# Patient Record
Sex: FEMALE
[De-identification: names, ages, dates, MRNs, and addresses within clinical notes are randomized per-mention and may not be internally consistent; named-entity substitution may affect disease eponyms.]

---

## 2017-06-28 ENCOUNTER — HOSPITAL ENCOUNTER (EMERGENCY)
Dept: HOSPITAL 14 - H.ER | Age: 22
Discharge: HOME | End: 2017-06-28
Payer: COMMERCIAL

## 2017-06-28 VITALS
OXYGEN SATURATION: 100 % | TEMPERATURE: 98.3 F | RESPIRATION RATE: 16 BRPM | DIASTOLIC BLOOD PRESSURE: 71 MMHG | SYSTOLIC BLOOD PRESSURE: 107 MMHG

## 2017-06-28 VITALS — HEART RATE: 67 BPM

## 2017-06-28 VITALS — BODY MASS INDEX: 22.6 KG/M2

## 2017-06-28 DIAGNOSIS — R07.89: Primary | ICD-10-CM

## 2017-06-28 DIAGNOSIS — J45.909: ICD-10-CM

## 2017-06-28 DIAGNOSIS — R51: ICD-10-CM

## 2017-06-28 LAB
ALBUMIN SERPL-MCNC: 4.7 G/DL (ref 3.5–5)
ALBUMIN/GLOB SERPL: 1.5 {RATIO} (ref 1–2.1)
ALT SERPL-CCNC: 42 U/L (ref 9–52)
AST SERPL-CCNC: 27 U/L (ref 14–36)
BASOPHILS # BLD AUTO: 0.1 K/UL (ref 0–0.2)
BASOPHILS NFR BLD: 0.8 % (ref 0–2)
BUN SERPL-MCNC: 14 MG/DL (ref 7–17)
CALCIUM SERPL-MCNC: 9.3 MG/DL (ref 8.4–10.2)
EOSINOPHIL # BLD AUTO: 0.2 K/UL (ref 0–0.7)
EOSINOPHIL NFR BLD: 2.5 % (ref 0–4)
ERYTHROCYTE [DISTWIDTH] IN BLOOD BY AUTOMATED COUNT: 12.9 % (ref 11.5–14.5)
GFR NON-AFRICAN AMERICAN: > 60
HGB BLD-MCNC: 14.3 G/DL (ref 12–16)
LYMPHOCYTES # BLD AUTO: 2.6 K/UL (ref 1–4.3)
LYMPHOCYTES NFR BLD AUTO: 42.2 % (ref 20–40)
MCH RBC QN AUTO: 31.8 PG (ref 27–31)
MCHC RBC AUTO-ENTMCNC: 33.7 G/DL (ref 33–37)
MCV RBC AUTO: 94.2 FL (ref 81–99)
MONOCYTES # BLD: 0.6 K/UL (ref 0–0.8)
MONOCYTES NFR BLD: 9.3 % (ref 0–10)
NEUTROPHILS # BLD: 2.7 K/UL (ref 1.8–7)
NEUTROPHILS NFR BLD AUTO: 45.2 % (ref 50–75)
NRBC BLD AUTO-RTO: 0.1 % (ref 0–0)
PLATELET # BLD: 306 K/UL (ref 130–400)
PMV BLD AUTO: 7.6 FL (ref 7.2–11.7)
RBC # BLD AUTO: 4.5 MIL/UL (ref 3.8–5.2)
WBC # BLD AUTO: 6.1 K/UL (ref 4.8–10.8)

## 2017-06-28 NOTE — CT
CT head without IV contrast 



History: Headache 



Technique: 



Axial computed tomography images were obtained through the head/brain 

without intravenous contrast.  



This CT exam was performed using 1 or more of the falling dose 

reduction techniques: Automated exposure control, adjustment of the 

MAA and/or kV according to patient size, and/or use of iterative 

reconstruction technique. 



Radiation dose:



Total exam DLP = 811.25 mGy-cm.



Comparison: Noncontrast head CT performed 7/1/15 



Findings: 



The ventricles, sulci, and cisterns appear within normal limits.  No 

intracranial masses or hemorrhages are identified. The gray-white 

matter differentiation appears intact.  Please note that MRI with 

diffusion imaging is more sensitive in the detection of acute 

ischemic event.



Visualized paranasal sinuses, mastoid air cells, and both orbits 

appear unremarkable. 



Impression: 



No acute intracranial pathology identified. 



Preliminary impression was provided by virtual radiologic.

## 2017-06-28 NOTE — ED PDOC
HPI: Chest Pain


Time Seen by Provider: 17 00:50


Chief Complaint (Nursing): Chest Pain


Chief Complaint (Provider): chest pain


History Per: Patient


History/Exam Limitations: no limitations


Onset/Duration Of Symptoms: Hrs


Current Symptoms Are (Timing): Still Present


Pain Scale Rating Of: 6


Additional Complaint(s): 


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 

started tonight, 6/10 in severity. Patient states she has also had a headache 

for 1 month. Does not see a cardiologist and has had similar chest pain before. 

Denies fever, cough, v/d, SOB, abdominal pain. 








Past Medical History


Reviewed: Historical Data, Nursing Documentation, Vital Signs


Vital Signs: 


 Last Vital Signs











Temp  98.3 F   17 01:05


 


Pulse  67   17 01:51


 


Resp  16   17 01:05


 


BP  107/71   17 01:05


 


Pulse Ox  100   17 03:41














- Medical History


PMH: Asthma


   Denies: Alzheimer's Disease, Chronic Kidney Disease


Other PMH: WPW 





- Surgical History


Surgical History: No Surg Hx





- Family History


Family History: States: No Known Family Hx





- Social History


Current smoker - smoking cessation education provided: No


Alcohol: None


Drugs: Denies





- Home Medications


Home Medications: 


 Ambulatory Orders











 Medication  Instructions  Recorded


 


Prenatal Vit No.126/Iron/Folic 1 tab PO DAILY 16





[Prenavite]  


 


Ondansetron [Zofran Odt] 4 mg PO ASDIR PRN #10 odt 16


 


Albuterol HFA [Ventolin HFA 90 1 puff IH Q6 PRN #1 inhaler 17





mcg/actuation (8 g)]  














- Allergies


Allergies/Adverse Reactions: 


 Allergies











Allergy/AdvReac Type Severity Reaction Status Date / Time


 


influenza virus vaccine, Allergy  ANAPHYLAXIS Verified 16 13:19





specific     





[influenza virus     





vacc,specific]     


 


latex Allergy  RASH Verified 16 13:19


 


oseltamivir phosphate Allergy  ANAPHYLAXIS Verified 16 13:19





[From Tamiflu]     


 


pomegranate Allergy  RASH Verified 16 13:19














Review of Systems


ROS Statement: Except As Marked, All Systems Reviewed And Found Negative


Constitutional: Negative for: Fever


Cardiovascular: Positive for: Chest Pain


Respiratory: Negative for: Cough, Shortness of Breath


Gastrointestinal: Negative for: Vomiting, Abdominal Pain, Diarrhea


Neurological: Positive for: Headache





Physical Exam





- Reviewed


Nursing Documentation Reviewed: Yes


Vital Signs Reviewed: Yes





- Physical Exam


Appears: Positive for: Well, No Acute Distress


Head Exam: Positive for: ATRAUMATIC, NORMAL INSPECTION, NORMOCEPHALIC


Skin: Positive for: Normal Color


Neck: Positive for: Normal


Cardiovascular/Chest: Positive for: Regular Rate, Rhythm.  Negative for: Murmur

, Tachycardia


Respiratory: Positive for: Normal Breath Sounds.  Negative for: Wheezing, 

Respiratory Distress


Gastrointestinal/Abdominal: Positive for: Normal Exam, Soft.  Negative for: 

Tenderness


Back: Positive for: Normal Inspection


Extremity: Positive for: Normal ROM.  Negative for: Deformity, Swelling


Neurologic/Psych: Positive for: Alert, Oriented





- Laboratory Results


Result Diagrams: 


 17 02:55





 17 02:55





- ECG


ECG: Positive for: Interpreted By Me, Viewed By Me


ECG Rhythm: Positive for: Sinus Rhythm (WPW, NSR at 73 bpm )


O2 Sat by Pulse Oximetry: 100


Pulse Ox Interpretation: Normal (RA)





Medical Decision Making


Medical Decision Makin:


Impression: chest pain, headache





Plan:


CT head


Labs


Toradol 30mg IV


reassess





0208:


CT head impression:


1. No acute intracranial abnormality.





0332: Labs reviewed, WNL. CT negative. Patient has been sleeping comfortably in 

ED and is stable for d/c. Advised patient to f/u outpatient with cardiologist 

and neurologist. Return to the ED with any worsening or concerning symptoms. 








--------------------------


Scribe Attestation:


Documented by BIANCA Ingram acting as a scribe for Gloria Chahal MD.    


Provider Scribe Attestation:


All medical record entries made by the Scribe were at my direction and 

personally dictated by me. I


have reviewed the chart and agree that the record accurately reflects my 

personal performance of the


history, physical exam, medical decision making, and the department course for 

this patient. I have


also personally directed, reviewed, and agree with the discharge instructions 

and disposition.    








Disposition





- Clinical Impression


Clinical Impression: 


 Chest wall pain, Headache








- Patient ED Disposition


Is Patient to be Admitted: No


Counseled Patient/Family Regarding: Studies Performed, Diagnosis, Need For 

Followup





- Disposition


Referrals: 


 Service [Outside]


Pritesh Lang MD [Staff Provider] - 


Claude Pierce MD [Staff Provider] - 


Disposition: Routine/Home


Disposition Time: 03:00


Condition: IMPROVED


Additional Instructions: 


follow up with your doctor within 1-2 days


return to the ED with any worsening or concerning symptoms. 


Instructions:  Chest Wall Pain (ED), General Headache (ED)

## 2017-07-06 NOTE — CARD
--------------- APPROVED REPORT --------------





EKG Measurement

Heart [id number]

VT [id number]

[id number]

[id number]

[id number]



<Conclusion>

Normal sinus rhythm

Ventricular pre-excitation, WPW pattern type B

Abnormal ECG

## 2017-08-25 ENCOUNTER — HOSPITAL ENCOUNTER (EMERGENCY)
Dept: HOSPITAL 14 - H.ER | Age: 22
Discharge: HOME | End: 2017-08-25
Payer: COMMERCIAL

## 2017-08-25 VITALS
OXYGEN SATURATION: 100 % | DIASTOLIC BLOOD PRESSURE: 65 MMHG | RESPIRATION RATE: 16 BRPM | HEART RATE: 80 BPM | TEMPERATURE: 99.1 F | SYSTOLIC BLOOD PRESSURE: 105 MMHG

## 2017-08-25 VITALS — BODY MASS INDEX: 19.9 KG/M2

## 2017-08-25 DIAGNOSIS — T14.8: ICD-10-CM

## 2017-08-25 DIAGNOSIS — M79.604: Primary | ICD-10-CM
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
## 2017-08-25 NOTE — US
EXAM:

  US Duplex Right Lower Extremity Veins



EXAM DATE/TIME:

  8/25/2017 6:38 PM



CLINICAL HISTORY:

  21 years old, female; Pain; Leg, lower; Right; Additional info: Right leg pain



TECHNIQUE:

  Real-time ultrasound scan of the veins of the right lower extremity with 

color Doppler flow, spectral waveform analysis and compression.



COMPARISON:

  There are no prior studies for comparison.



FINDINGS:

  Deep veins: Common femoral, superficial femoral, popliteal and posterior 

tibial veins were evaluated.



All veins examined are compressible. There are no intraluminal filling defects. 

There is expected blood flow on Doppler imaging. There is change in waveform 

with augmentation.



  Soft tissues: No focal abnormality is seen in the area cutaneous erythema



Impression:  No deep venous thrombosis in the visualized vascular segments of 

the right lower extremity

## 2017-08-25 NOTE — ED PDOC
HPI: General Adult


Time Seen by Provider: 08/25/17 18:38


Chief Complaint (Nursing): Lower Extremity Problem/Injury


Chief Complaint (Provider): right leg pain


History Per: Patient (20 y/o female sent by a clinic for evaluation of right 

calf pain.  Patient states she has noted small bump that she assumed was insect 

bite.  She has scratched this location intermittently but notes increasing pain 

since.  )





Past Medical History


Reviewed: Historical Data, Nursing Documentation, Vital Signs


Vital Signs: 


 Last Vital Signs











Temp  99.1 F   08/25/17 18:24


 


Pulse  80   08/25/17 18:24


 


Resp  16   08/25/17 18:24


 


BP  105/65   08/25/17 18:24


 


Pulse Ox  100   08/25/17 18:42














- Medical History


PMH: Asthma, CAD (WPW)


   Denies: Alzheimer's Disease, Chronic Kidney Disease





- Family History


Family History: States: Unknown Family Hx





- Home Medications


Home Medications: 


 Ambulatory Orders











 Medication  Instructions  Recorded


 


Prenatal Vit No.126/Iron/Folic 1 tab PO DAILY 09/19/16





[Prenavite]  


 


Ondansetron [Zofran Odt] 4 mg PO ASDIR PRN #10 odt 12/05/16


 


Albuterol HFA [Ventolin HFA 90 1 puff IH Q6 PRN #1 inhaler 03/04/17





mcg/actuation (8 g)]  


 


Cephalexin [Keflex] 1 tab PO QID #28 capsule 08/25/17


 


Hydrocortisone 1% Cream [Cortizone 0.5 gm TP BID #1 tube 08/25/17





1% Cream]  














- Allergies


Allergies/Adverse Reactions: 


 Allergies











Allergy/AdvReac Type Severity Reaction Status Date / Time


 


influenza virus vaccine, Allergy  ANAPHYLAXIS Verified 12/05/16 13:19





specific     





[influenza virus     





vacc,specific]     


 


latex Allergy  RASH Verified 12/05/16 13:19


 


oseltamivir phosphate Allergy  ANAPHYLAXIS Verified 12/05/16 13:19





[From Tamiflu]     


 


pomegranate Allergy  RASH Verified 12/05/16 13:19














Review of Systems


ROS Statement: Except As Marked, All Systems Reviewed And Found Negative





Physical Exam





- Reviewed


Nursing Documentation Reviewed: Yes


Vital Signs Reviewed: Yes





- Physical Exam


Appears: Positive for: Well, Non-toxic, No Acute Distress


Head Exam: Positive for: ATRAUMATIC, NORMAL INSPECTION, NORMOCEPHALIC


Skin: Positive for: Normal Color, Warm, DRY


Eye Exam: Positive for: EOMI, Normal appearance, PERRL


ENT: Positive for: Normal ENT Inspection


Neck: Positive for: Normal, Painless ROM


Cardiovascular/Chest: Positive for: Regular Rate, Rhythm


Respiratory: Positive for: CNT, Normal Breath Sounds


Gastrointestinal/Abdominal: Positive for: Normal Exam, Bowel Sounds, Soft


Back: Positive for: Normal Inspection


Extremity: Positive for: Normal ROM, Tenderness (right calf tenderness.  (+) 

hyperpigmentation noted right calf region.  small lesions noted hyperdense 

generalized on right calf.)


Neurologic/Psych: Positive for: Alert, Oriented





- ECG


O2 Sat by Pulse Oximetry: 100





- Progress


ED Course And Treament: 





DUPLEX RIGHT: NEG FOR DVT





Medical Decision Making


Medical Decision Making: 





Rash does not appear like shingles.  Moderate tenderness noted.  Possible 

infected insect bite. WIll r/o DVT





Disposition





- Clinical Impression


Clinical Impression: 


 Insect bite








- Patient ED Disposition


Is Patient to be Admitted: No





- Disposition


Referrals: 


[facility] [Outside]


Disposition: Routine/Home


Disposition Time: 19:15


Condition: STABLE


Prescriptions: 


Cephalexin [Keflex] 1 tab PO QID #28 capsule


Hydrocortisone 1% Cream [Cortizone 1% Cream] 0.5 gm TP BID #1 tube


Instructions:  Insect Bite or Sting (ED)

## 2017-09-08 ENCOUNTER — HOSPITAL ENCOUNTER (EMERGENCY)
Dept: HOSPITAL 14 - H.ER | Age: 22
Discharge: HOME | End: 2017-09-08
Payer: COMMERCIAL

## 2017-09-08 VITALS
OXYGEN SATURATION: 99 % | SYSTOLIC BLOOD PRESSURE: 110 MMHG | HEART RATE: 90 BPM | RESPIRATION RATE: 18 BRPM | DIASTOLIC BLOOD PRESSURE: 70 MMHG

## 2017-09-08 VITALS — TEMPERATURE: 98.8 F

## 2017-09-08 VITALS — BODY MASS INDEX: 19.9 KG/M2

## 2017-09-08 DIAGNOSIS — N93.9: ICD-10-CM

## 2017-09-08 DIAGNOSIS — J32.9: Primary | ICD-10-CM

## 2017-09-08 LAB
ALBUMIN/GLOB SERPL: 1.3 {RATIO} (ref 1–2.1)
ALP SERPL-CCNC: 117 U/L (ref 38–126)
ALT SERPL-CCNC: 140 U/L (ref 9–52)
AST SERPL-CCNC: 99 U/L (ref 14–36)
BASOPHILS # BLD AUTO: 0 K/UL (ref 0–0.2)
BASOPHILS NFR BLD: 0.3 % (ref 0–2)
BILIRUB SERPL-MCNC: 1.1 MG/DL (ref 0.2–1.3)
BUN SERPL-MCNC: 9 MG/DL (ref 7–17)
CALCIUM SERPL-MCNC: 9.5 MG/DL (ref 8.4–10.2)
CHLORIDE SERPL-SCNC: 102 MMOL/L (ref 98–107)
CO2 SERPL-SCNC: 26 MMOL/L (ref 22–30)
EOSINOPHIL # BLD AUTO: 0 K/UL (ref 0–0.7)
EOSINOPHIL NFR BLD: 0.4 % (ref 0–4)
ERYTHROCYTE [DISTWIDTH] IN BLOOD BY AUTOMATED COUNT: 12.4 % (ref 11.5–14.5)
ERYTHROCYTE [SEDIMENTATION RATE] IN BLOOD: 62 MM/HR (ref 0–20)
GLOBULIN SER-MCNC: 3.4 GM/DL (ref 2.2–3.9)
GLUCOSE SERPL-MCNC: 98 MG/DL (ref 65–105)
HCT VFR BLD CALC: 40.5 % (ref 34–47)
LYMPHOCYTES # BLD AUTO: 1.4 K/UL (ref 1–4.3)
LYMPHOCYTES NFR BLD AUTO: 12.7 % (ref 20–40)
MCH RBC QN AUTO: 30.7 PG (ref 27–31)
MCHC RBC AUTO-ENTMCNC: 32.8 G/DL (ref 33–37)
MCV RBC AUTO: 93.5 FL (ref 81–99)
MONOCYTES # BLD: 1.1 K/UL (ref 0–0.8)
MONOCYTES NFR BLD: 10.3 % (ref 0–10)
NEUTROPHILS # BLD: 8.4 K/UL (ref 1.8–7)
NEUTROPHILS NFR BLD AUTO: 76.3 % (ref 50–75)
NRBC BLD AUTO-RTO: 0.1 % (ref 0–0)
PLATELET # BLD: 289 K/UL (ref 130–400)
PMV BLD AUTO: 7.5 FL (ref 7.2–11.7)
POTASSIUM SERPL-SCNC: 4.1 MMOL/L (ref 3.6–5)
PROT SERPL-MCNC: 7.6 G/DL (ref 6.3–8.2)
SODIUM SERPL-SCNC: 139 MMOL/L (ref 132–148)
WBC # BLD AUTO: 11 K/UL (ref 4.8–10.8)

## 2017-09-08 PROCEDURE — 99283 EMERGENCY DEPT VISIT LOW MDM: CPT

## 2017-09-08 PROCEDURE — 85025 COMPLETE CBC W/AUTO DIFF WBC: CPT

## 2017-09-08 PROCEDURE — 70470 CT HEAD/BRAIN W/O & W/DYE: CPT

## 2017-09-08 PROCEDURE — 85651 RBC SED RATE NONAUTOMATED: CPT

## 2017-09-08 PROCEDURE — 81025 URINE PREGNANCY TEST: CPT

## 2017-09-08 PROCEDURE — 80053 COMPREHEN METABOLIC PANEL: CPT

## 2017-09-08 PROCEDURE — 96374 THER/PROPH/DIAG INJ IV PUSH: CPT

## 2017-09-08 NOTE — ED PDOC
HPI: Eye Injury/Pain


Time Seen by Provider: 09/08/17 11:49


Chief Complaint (Nursing): Eye Problem


Chief Complaint (Provider): Neurological Problem


History Per: Patient


History/Exam Limitations: no limitations


Onset/Duration Of Symptoms: Days (x2)


Current Symptoms Are (Timing): Still Present


Additional Complaint(s): 





Lor Ch is a 22 year old female presenting to the ED for an evaluation 

of right sided facial pain associated with blurry vision in her right eye and 

facial swelling occurring for 2 days prior to arrival. The patient reports 

being seen by a dentist where she had X-rays of her teeth completed and was 

told the problem is not her tooth. She states have subjective fevers on and off 

and heavy vaginal bleeding today. The patient reports taking Augmentin and 

Motrin for the pain, without relief. She denies nasal discharge, weakness in 

her extremities, difficulty in gait, coughing, or vomiting. 





PMD: None Provided 








Past Medical History


Reviewed: Historical Data, Nursing Documentation, Vital Signs


Vital Signs: 


 Last Vital Signs











Temp  98.8 F   09/08/17 11:26


 


Pulse  112 H  09/08/17 11:26


 


Resp  16   09/08/17 11:26


 


BP  118/70   09/08/17 11:26


 


Pulse Ox  98   09/08/17 11:29














- Medical History


PMH: Asthma, CAD (WPW)


   Denies: Alzheimer's Disease, Chronic Kidney Disease





- Family History


Family History: States: Unknown Family Hx





- Social History


Current smoker - smoking cessation education provided: Yes


Ex-Smoker (has not smoked in the last 12 months): No


Alcohol: None


Drugs: Denies





- Home Medications


Home Medications: 


 Ambulatory Orders











 Medication  Instructions  Recorded


 


Prenatal Vit No.126/Iron/Folic 1 tab PO DAILY 09/19/16





[Prenavite]  


 


Ondansetron [Zofran Odt] 4 mg PO ASDIR PRN #10 odt 12/05/16


 


Albuterol HFA [Ventolin HFA 90 1 puff IH Q6 PRN #1 inhaler 03/04/17





mcg/actuation (8 g)]  


 


Cephalexin [Keflex] 1 tab PO QID #28 capsule 08/25/17


 


Hydrocortisone 1% Cream [Cortizone 0.5 gm TP BID #1 tube 08/25/17





1% Cream]  


 


DiphenhydrAMINE [Benadryl] 50 mg PO Q6 PRN #24 cap 09/08/17


 


Levofloxacin [Levaquin] 500 mg PO DAILY #10 tablet 09/08/17


 


Naproxen 1 tab PO BID PRN #14 tab 09/08/17


 


Pseudoephedrine [Sudafed Tab] 60 mg PO Q6 PRN #20 tab 09/08/17


 


oxyCODONE/Acetaminophen [Percocet 1 ea PO Q6 PRN #6 tab 09/08/17





5/325 mg Tab]  


 


predniSONE [Prednisone] 3 tab PO DAILY #12 tab 09/08/17














- Allergies


Allergies/Adverse Reactions: 


 Allergies











Allergy/AdvReac Type Severity Reaction Status Date / Time


 


influenza virus vaccine, Allergy  ANAPHYLAXIS Verified 09/08/17 11:27





specific     





[influenza virus     





vacc,specific]     


 


latex Allergy  RASH Verified 09/08/17 11:27


 


oseltamivir phosphate Allergy  ANAPHYLAXIS Verified 09/08/17 11:27





[From Tamiflu]     


 


pomegranate Allergy  RASH Verified 09/08/17 11:27














Review of Systems


ROS Statement: Except As Marked, All Systems Reviewed And Found Negative


Constitutional: Positive for: Fever (subjective )


Eyes: Positive for: Vision Change (blurry vision in right eye )


ENT: Negative for: Nose Discharge


Respiratory: Negative for: Cough


Gastrointestinal: Negative for: Vomiting


Genitourinary Female: Positive for: Vaginal Bleeding (heavy)


Musculoskeletal: Negative for: Arm Pain, Leg Pain


Neurological: Positive for: Other (right sided facial pain; facial swelling ).  

Negative for: Incoordination





Physical Exam





- Reviewed


Nursing Documentation Reviewed: Yes


Vital Signs Reviewed: Yes





- Physical Exam


Appears: Positive for: Non-toxic, No Acute Distress


Head Exam: Positive for: ATRAUMATIC, NORMOCEPHALIC (facial swelling noted; 

tenderness along right side of face; nontendor dentition; difficulty noted 

opening jaw)


Skin: Positive for: Normal Color, Warm, Dry


Eye Exam: Positive for: Normal appearance, EOMI, PERRL


ENT: Positive for: Normal ENT Inspection


Neck: Positive for: Normal, Painless ROM


Cardiovascular/Chest: Positive for: Regular Rate, Rhythm.  Negative for: Murmur


Respiratory: Positive for: Normal Breath Sounds.  Negative for: Respiratory 

Distress


Gastrointestinal/Abdominal: Positive for: Normal Exam, Soft.  Negative for: 

Tenderness


Back: Positive for: Normal Inspection


Extremity: Positive for: Normal ROM.  Negative for: Deformity


DTR - Bicep (R): 2+


DTR - Bicep (L): 2+


DTR - Tricep (R): 2+


DTR - Tricep (L): 2+


DTR - Knee (R): 2+


DTR - Knee (L): 2+


DTR - Ankle (R): 2+


DTR - Ankle (L): 2+


Neurologic/Psych: Positive for: Alert, Oriented (x3), Other (5/5  strength )

.  Negative for: Motor/Sensory Deficits





- Laboratory Results


Result Diagrams: 


 09/08/17 12:40





 09/08/17 12:40





- ECG


O2 Sat by Pulse Oximetry: 98 (RA)


Pulse Ox Interpretation: Normal





- Progress


ED Course And Treament: 





Called to bedside after IV pulled out. Patient noted to have generalized 

urticaria.  After discussion with patient, will given benadryl 50 mg x 1 dose/

pepcid 20 mg/ prednisone 60 mg x 1 dose











Medical Decision Making


Medical Decision Making: 





Time: 11:49


Impression: Neurological Problem


Plan: 


* CT Head W/O Contrast


* CMP 


* CBC (With differential)


* SED Rate 


* NS 1,000 ml  mls/hr


* Toradol 15 mg IVP


* Reevaluation





--------------------------------------------------------------------------------

----------------- 


Scribe Attestation:


Documented by Charmaine Guallpa, acting as a scribe for Quentin Perez PA-C.


 


Provider Scribe Attestation:


All medical record entries made by the Scribe were at my direction and 

personally dictated by me. I have reviewed the chart and agree that the record 

accurately reflects my personal performance of the history, physical exam, 

medical decision making, and the department course for this patient. I have 

also personally directed, reviewed, and agree with the discharge instructions 

and disposition.








Disposition





- Clinical Impression


Clinical Impression: 


 Sinusitis








- Patient ED Disposition


Is Patient to be Admitted: No





- Disposition


Referrals: 


Marcelo Mulligan MD [Staff Provider] - 


Disposition: Routine/Home


Disposition Time: 15:27


Condition: FAIR


Prescriptions: 


DiphenhydrAMINE [Benadryl] 50 mg PO Q6 PRN #24 cap


 PRN Reason: Itching / Pruritus


Levofloxacin [Levaquin] 500 mg PO DAILY #10 tablet


Naproxen 1 tab PO BID PRN #14 tab


 PRN Reason: Pain, Moderate (4-7)


oxyCODONE/Acetaminophen [Percocet 5/325 mg Tab] 1 ea PO Q6 PRN #6 tab


 PRN Reason: Pain, Severe (8-10)


predniSONE [Prednisone] 3 tab PO DAILY #12 tab


Pseudoephedrine [Sudafed Tab] 60 mg PO Q6 PRN #20 tab


 PRN Reason: Nasal Congestion


Instructions:  Sinusitis (ED)


Forms:  [facility] (English), [facility] ED School/Work Excuse

## 2018-02-13 ENCOUNTER — HOSPITAL ENCOUNTER (EMERGENCY)
Dept: HOSPITAL 14 - H.ER | Age: 23
Discharge: HOME | End: 2018-02-13
Payer: COMMERCIAL

## 2018-02-13 VITALS — SYSTOLIC BLOOD PRESSURE: 105 MMHG | HEART RATE: 75 BPM | DIASTOLIC BLOOD PRESSURE: 63 MMHG | RESPIRATION RATE: 17 BRPM

## 2018-02-13 VITALS — TEMPERATURE: 98.2 F

## 2018-02-13 VITALS — BODY MASS INDEX: 19.9 KG/M2

## 2018-02-13 DIAGNOSIS — R07.89: Primary | ICD-10-CM

## 2018-02-13 LAB
APTT BLD: 27.2 SECONDS (ref 25.6–37.1)
BASOPHILS # BLD AUTO: 0 K/UL (ref 0–0.2)
BASOPHILS NFR BLD: 0.4 % (ref 0–2)
BUN SERPL-MCNC: 7 MG/DL (ref 7–17)
CALCIUM SERPL-MCNC: 9 MG/DL (ref 8.4–10.2)
D DIMER: 211 NG/MLDDU (ref 0–230)
EOSINOPHIL # BLD AUTO: 0.1 K/UL (ref 0–0.7)
EOSINOPHIL NFR BLD: 0.6 % (ref 0–4)
ERYTHROCYTE [DISTWIDTH] IN BLOOD BY AUTOMATED COUNT: 14.6 % (ref 11.5–14.5)
GFR NON-AFRICAN AMERICAN: > 60
HGB BLD-MCNC: 13.1 G/DL (ref 12–16)
INR PPP: 1 (ref 0.9–1.2)
LYMPHOCYTES # BLD AUTO: 2.1 K/UL (ref 1–4.3)
LYMPHOCYTES NFR BLD AUTO: 22 % (ref 20–40)
MCH RBC QN AUTO: 31.6 PG (ref 27–31)
MCHC RBC AUTO-ENTMCNC: 33.9 G/DL (ref 33–37)
MCV RBC AUTO: 93.2 FL (ref 81–99)
MONOCYTES # BLD: 0.7 K/UL (ref 0–0.8)
MONOCYTES NFR BLD: 7.3 % (ref 0–10)
NEUTROPHILS # BLD: 6.6 K/UL (ref 1.8–7)
NEUTROPHILS NFR BLD AUTO: 69.7 % (ref 50–75)
NRBC BLD AUTO-RTO: 0.1 % (ref 0–0)
PLATELET # BLD: 276 K/UL (ref 130–400)
PMV BLD AUTO: 7.4 FL (ref 7.2–11.7)
PROTHROMBIN TIME: 11.1 SECONDS (ref 9.8–13.1)
RBC # BLD AUTO: 4.14 MIL/UL (ref 3.8–5.2)
WBC # BLD AUTO: 9.5 K/UL (ref 4.8–10.8)

## 2018-02-13 NOTE — ED PDOC
HPI: Chest Pain


Time Seen by Provider: 18 17:38


Chief Complaint (Nursing): Chest Pain


Chief Complaint (Provider): Chest pain


History Per: Patient


History/Exam Limitations: no limitations


Onset/Duration Of Symptoms: Days (1)


Current Symptoms Are (Timing): Still Present


Additional History Per: Patient


Additional Complaint(s): 


21yo female,  with EGA of 16 weeks per latest ultrasound, presents to ED 

with complaints of left sided chest pain radiating to her left axilla area 

since yesterday morning. Of note, patient states she has a history of WPW and 

has not had any complications due to it; she denies any history of cardiac 

procedures as well. Patient states her chest pain is worse with deep 

inspiration and states she feels dizziness, numbness in left arm and tingling 

in her left hand. She denies any cough, fevers, loss of consciousness, leg 

swelling. No other complaints.





Past Medical History


Reviewed: Historical Data, Nursing Documentation, Vital Signs


Vital Signs: 


 Last Vital Signs











Temp  98.2 F   18 17:24


 


Pulse  81   18 18:37


 


Resp  16   18 17:24


 


BP  110/63   18 17:24


 


Pulse Ox  99   18 18:37














- Medical History


PMH: Asthma, CAD (WPW)


   Denies: Alzheimer's Disease, Chronic Kidney Disease





- Surgical History


Surgical History: No Surg Hx





- Family History


Family History: States: Unknown Family Hx





- Home Medications


Home Medications: 


 Ambulatory Orders











 Medication  Instructions  Recorded


 


Prenatal Vit No.126/Iron/Folic 1 tab PO DAILY 16





[Prenavite]  


 


Ondansetron [Zofran Odt] 4 mg PO ASDIR PRN #10 odt 16


 


Albuterol HFA [Ventolin HFA 90 1 puff IH Q6 PRN #1 inhaler 17





mcg/actuation (8 g)]  


 


Cephalexin [Keflex] 1 tab PO QID #28 capsule 17


 


Hydrocortisone 1% Cream [Cortizone 0.5 gm TP BID #1 tube 17





1% Cream]  


 


DiphenhydrAMINE [Benadryl] 50 mg PO Q6 PRN #24 cap 17


 


Levofloxacin [Levaquin] 500 mg PO DAILY #10 tablet 17


 


Naproxen 1 tab PO BID PRN #14 tab 17


 


Pseudoephedrine [Sudafed Tab] 60 mg PO Q6 PRN #20 tab 17


 


oxyCODONE/Acetaminophen [Percocet 1 ea PO Q6 PRN #6 tab 17





5/325 mg Tab]  


 


predniSONE [Prednisone] 3 tab PO DAILY #12 tab 17














- Allergies


Allergies/Adverse Reactions: 


 Allergies











Allergy/AdvReac Type Severity Reaction Status Date / Time


 


influenza virus vaccine, Allergy  ANAPHYLAXIS Verified 17 11:27





specific     





[influenza virus     





vacc,specific]     


 


latex Allergy  RASH Verified 17 11:27


 


oseltamivir phosphate Allergy  ANAPHYLAXIS Verified 17 11:27





[From Tamiflu]     


 


pomegranate Allergy  RASH Verified 17 11:27














Review of Systems


ROS Statement: Except As Marked, All Systems Reviewed And Found Negative


Constitutional: Negative for: Fever, Chills


Cardiovascular: Positive for: Chest Pain


Respiratory: Negative for: Cough, Shortness of Breath


Neurological: Positive for: Numbness (left arm), Dizziness





Physical Exam





- Reviewed


Nursing Documentation Reviewed: Yes


Vital Signs Reviewed: Yes





- Physical Exam


Appears: Positive for: Non-toxic, No Acute Distress


Head Exam: Positive for: ATRAUMATIC, NORMAL INSPECTION, NORMOCEPHALIC


Skin: Positive for: Normal Color, Warm, Dry


Eye Exam: Positive for: Normal appearance, EOMI, PERRL


Neck: Positive for: Normal, Supple


Cardiovascular/Chest: Positive for: Regular Rate, Rhythm


Respiratory: Positive for: Normal Breath Sounds.  Negative for: Wheezing


Gastrointestinal/Abdominal: Positive for: Other (gravid abdomen)


Back: Positive for: Normal Inspection


Extremity: Positive for: Normal ROM.  Negative for: Pedal Edema


Neurologic/Psych: Positive for: Alert, Oriented.  Negative for: Motor/Sensory 

Deficits





- Laboratory Results


Result Diagrams: 


 18 18:40





 18 18:40





- ECG


ECG Rhythm: Positive for: Sinus Rhythm.  Negative for: ST/T Changes


Interpretation Of ECG: 





Short AR wave


Rate: 81


O2 Sat by Pulse Oximetry: 99 (RA)


Pulse Ox Interpretation: Normal





Medical Decision Making


Medical Decision Making: 


Impression: Chest pain


Differential: ACS, pulmonary embolism


Plan:


-- EKG


-- Labs





Time: 


Case signed out to Dr. Perez pending ER workup, re-evaluation and disposition.





Scribe Attestation:


Documented by Mary Ann Doty acting as a scribe for Carina Clemens MD. 





Provider Attestation:


All medical record entries made by the Scribe were at my direction and 

personally dictated by me. I have reviewed the chart and agree that the record 

accurately reflects my personal performance of the history, physical exam, 

medical decision making, and the department course for this patient. I have 

also personally directed, reviewed, and agree with the discharge instructions 

and disposition.





Disposition





- Patient ED Disposition


Is Patient to be Admitted: Transfer of Care





- Disposition


Disposition: Transfer of Care


Disposition Time: 19:00


Forms:  [employer] (English)


Patient Signed Over To: Juan Pablo Perez

## 2018-02-13 NOTE — ED PDOC
- Laboratory Results


Result Diagrams: 


 02/13/18 18:40





 02/13/18 18:40





- ECG


O2 Sat by Pulse Oximetry: 99 (RA)


Pulse Ox Interpretation: Normal





Medical Decision Making


Medical Decision Making: 


Time: 1900


Patient signed out to me by Dr. Clemens pending labs, re-evaluation.





Time: 2000


Labs reviewed, D-Dimer levels with normal limits. Patient reports improvement 

of pain and is stable for discharge home. Patient to follow up with PCP in 1-2 

days.





Scribe Attestation:


Documented by Mary Ann Doty acting as a scribe for Juan Pablo Perez MD. 





Provider Scribe Attestation:


All medical record entries made by the Scribe were at my direction and 

personally dictated by me. I have reviewed the chart and agree that the record 

accurately reflects my personal performance of the history, physical exam, 

medical decision making, and the department course for this patient. I have 

also personally directed, reviewed, and agree with the discharge instructions 

and disposition.





Disposition





- Clinical Impression


Clinical Impression: 


 Chest pain








- POA


Present On Arrival: None





- Disposition


Disposition: Routine/Home


Disposition Time: 20:01


Condition: STABLE


Instructions:  Noncardiac Chest Pain (ED)


Forms:  [facility] (English)

## 2018-02-14 VITALS — OXYGEN SATURATION: 99 %

## 2018-03-05 ENCOUNTER — HOSPITAL ENCOUNTER (EMERGENCY)
Dept: HOSPITAL 14 - H.EROB2 | Age: 23
Discharge: HOME | End: 2018-03-05
Payer: COMMERCIAL

## 2018-03-05 VITALS — HEART RATE: 89 BPM | DIASTOLIC BLOOD PRESSURE: 49 MMHG | SYSTOLIC BLOOD PRESSURE: 111 MMHG

## 2018-03-05 VITALS — BODY MASS INDEX: 21.7 KG/M2

## 2018-03-05 DIAGNOSIS — R10.2: ICD-10-CM

## 2018-03-05 DIAGNOSIS — Z3A.19: ICD-10-CM

## 2018-03-05 DIAGNOSIS — O26.92: Primary | ICD-10-CM

## 2018-03-05 LAB
BACTERIA #/AREA URNS HPF: (no result) /[HPF]
BILIRUB UR-MCNC: NEGATIVE MG/DL
COLOR UR: YELLOW
GLUCOSE UR STRIP-MCNC: (no result) MG/DL
LEUKOCYTE ESTERASE UR-ACNC: (no result) LEU/UL
PH UR STRIP: 6 [PH] (ref 5–8)
PROT UR STRIP-MCNC: NEGATIVE MG/DL
RBC # UR STRIP: NEGATIVE /UL
SP GR UR STRIP: 1.01 (ref 1–1.03)
SQUAMOUS EPITHIAL: 3 /HPF (ref 0–5)
URINE CLARITY: (no result)
URINE NITRATE: NEGATIVE
UROBILINOGEN UR-MCNC: (no result) MG/DL (ref 0.2–1)

## 2018-03-05 NOTE — US
PROCEDURE:  Transvaginal obstruct ultrasonography



HISTORY:

lower abdominal pain



COMPARISON:

None available



TECHNIQUE:

Limited transvaginal obstetric ultrasound was performed as per 

referring physician request 4 cervical length only. This on 

discussion of the case by the technologist with Dr. Downs.



FINDINGS:

A single viable intrauterine gestation is identified with cardiac 

activity recorded 152 beats per minute. A high a posterior fundal 

placenta appears to developing with cervical internal os closed and 

overall cervical length measuring 4.7 cm. No fetal biometry 

performed/submitted.



IMPRESSION:

A single viable intrauterine gestation is identified with maternal 

cervical length of 4.7 cm without placental abruption or previa 

grossly evident.  Internal cervical os is closed. Fetal biometry in 

more complete evaluation can be performed on outpatient basis or is 

otherwise required. Please see discussion above.

## 2018-03-05 NOTE — OBHP
===========================

Datetime: 2018 13:03

===========================

   

IP Adm Impression:  , intrauterine pregnancy

IP Admit Plan:  Observation/Evaluation; Discharge home

Admit Comment, IP Provider:  23 yo  at 19+6 wks c/o bad cramps for 2-3 days, reports that she 
vomited x 4x on the 1st day of the cramping.  Pt report that she drinks a lot of water.  Pt denies VB
, LOF, and reports unsure about ctxns.  Pt reports that she doesn't have cramps now but has pain in l
ower abdomen.  Pt denies dysuria and reports that she has an appetite.  Pt reports that the pain is w
orse w/ long standing or can wake her from sleep at night.   

   PMH: Healthy 

   PSH: None 

   Meds: PNVs

   All: Penicillin and pomegranite cause throat swelling and hives 

   Flu shot causes "skin bubbles on affected arm" 

   Gyn hx: 10 x regular, denies STDs, denies abn paps

   Obhx: 2013 , female, 7#6

             2015 , male, 7#7

             2016 , female, 6# 

   Soc hx: pt denies tobacco, alcohol, and illicit drug use 

   Fam hx: N/c

   PE: AFVSS 

   Gen'l: NAD 

   Heart: RRR

   Chest: Lungs CTA b/l

   Abd: soft, NT, gravid, no CVA tenderness, mild suprapubic tenderness 

   Ext: NT, no edema

   VE: closed/ long/ posterior 

   A/P: 23 yo  at 19+6 wks w/ cramps x 2-3 days

   UA: blood and nitrate negative, LE small, bacteria rare; urine cx pending 

   U/s: Cervical length  4.7 cm w/o placental abruption or pevia grossly evident.  Internal os is mookie
sed.     

   Pain may be musculoskeletal, could be round ligament pain.  Rec that she stay well-hydrated and ma
y try tylenol for pain relief.   

   Discharged home w/ PTL precautions 

   Pt has an appointment w/ Dr. William on 2018.     

Extremities - PN:  Normal

Abdomen - PN:  Normal

Back - PN:  Normal

Lungs - PN:  Normal

Heart - PN:  Normal

Neurologic - PN:  Normal

General - PN:  Normal

FHR - Baseline A Provider:  155

Membranes, Provider:  Intact

Contraction Comments Provider:  None 

EGA AdmitDate IP:  19.6

Vital Signs Provider:  Reviewed

IP Chief Complaint:  Maternal discomfort

NICHD Decel Fetus A IP Provider:  None

Dilatation, Provider:  0

Effacement, Provider:  0

Station, Provider:  -3

Genitourinary Exam:  Normal

## 2018-03-05 NOTE — OBDCSUM
===========================

Datetime: 03/05/2018 15:20

===========================

   

Discharged to, Provider:  Home

Follow up at, Provider:  Dr William

Disch Instr Activity:  Normal activity

Disch Instr Diet:  Regular

Discharge Instructions, Provider:  Routine instructions given

Discharge Time:  03/05/2018 15:20

Follow up in weeks, Provider:  as per schedule

Disch Activity Restrictions:  No exercising; Minimize walking; Minimize stair-climbing; No sexual act
ivity; Nothing in vagina - [place], tampons, douche

Discharge Diagnosis Prov Other:  Maternal discomfort at 19+ weeks

## 2018-04-15 ENCOUNTER — HOSPITAL ENCOUNTER (EMERGENCY)
Dept: HOSPITAL 14 - H.ER | Age: 23
Discharge: HOME | End: 2018-04-15
Payer: COMMERCIAL

## 2018-04-15 VITALS
SYSTOLIC BLOOD PRESSURE: 103 MMHG | DIASTOLIC BLOOD PRESSURE: 66 MMHG | TEMPERATURE: 97.9 F | HEART RATE: 86 BPM | OXYGEN SATURATION: 99 % | RESPIRATION RATE: 16 BRPM

## 2018-04-15 VITALS — BODY MASS INDEX: 21.7 KG/M2

## 2018-04-15 DIAGNOSIS — I25.10: ICD-10-CM

## 2018-04-15 DIAGNOSIS — J32.9: Primary | ICD-10-CM

## 2018-04-15 DIAGNOSIS — Z88.0: ICD-10-CM

## 2018-04-15 DIAGNOSIS — J45.909: ICD-10-CM

## 2018-04-15 DIAGNOSIS — I45.6: ICD-10-CM

## 2018-04-15 NOTE — ED PDOC
HPI: CCC, URI, Sore Throat


Chief Complaint (Provider): Sinus


History Per: Patient


History/Exam Limitations: no limitations


Have you had recent travel within the past 21 days to any of the following 

countries: [place], [place], [place] or [place]?: No


Onset/Duration Of Symptoms: Waxing/Waning


Current Symptoms Are (Timing): Still Present


Location Of Pain: Sinus/es


Sick Contacts (Context): None


Associated Symptoms: Fever, Sinus Drainage, Nasal Congestion


Pain Scale Rating Of: 5


Additional History Per: Patient





<Ryder Omer - Last Filed: 04/15/18 05:40>





<Juan Pablo Perez - Last Filed: 18 05:35>


Time Seen by Provider: 04/15/18 05:11


Chief Complaint (Nursing): ENT Problem


Additional Complaint(s): 





23 y/o F 22 weeks pregnant with Hx of recurrent sinusitis presents to ED c/o 

sinus pain and nasal congestion. As per patient she started having symptoms 2-3 

days ago, she had a fever at home Yesterday, has been using saline drop that 

produces greenish drainage from L/nostril. Patient hasnt seen ENT for her 

recurrent sinus infection and dosnt have a PMD. She took Tylenol 50 mg before 

coming to hosp. As per patient she tried Amoxicillin in the past but developed 

hives. Old records show previous visits in 2017 for similar symptoms when 

patient was prescribed Keflex and Levaquin in separate occasions. Denies 

vomiting, dizziness, nausea, diarrhea, CO or SOB. (Ryder Omer)





Supervising Attending Note





- Supervising Attending Note


The Documented history was done by the: Physician Extender


The documented physical exam was done by the: Physician Extender


The documented procedures were done by the: Physician Extender





- Attestation:


I have personally seen and examined this patient.: Yes


I have fully participated in the care of the patient.: Yes


I have reviewed all pertinent clinical information, including history, physical 

exam and plan: Yes





<Juan Pablo Perez - Last Filed: 18 05:35>





Past Medical History


Reviewed: Historical Data, Nursing Documentation, Vital Signs





- Medical History


PMH: Asthma, CAD (WPW)


   Denies: Alzheimer's Disease, Chronic Kidney Disease





- Family History


Family History: States: Unknown Family Hx





<Ryder Omer - Last Filed: 04/15/18 05:40>





<Juan Pablo Perez Jeffery - Last Filed: 18 05:35>


Vital Signs: 





 Last Vital Signs











Temp  97.9 F   04/15/18 04:47


 


Pulse  86   04/15/18 04:47


 


Resp  16   04/15/18 04:47


 


BP  103/66   04/15/18 04:47


 


Pulse Ox  99   04/15/18 05:40














- Home Medications


Home Medications: 


 Ambulatory Orders











 Medication  Instructions  Recorded


 


Prenatal Vit No.126/Iron/Folic 1 tab PO DAILY 16





[Prenavite]  


 


Ondansetron [Zofran Odt] 4 mg PO ASDIR PRN #10 odt 16


 


Albuterol HFA [Ventolin HFA 90 1 puff IH Q6 PRN #1 inhaler 17





mcg/actuation (8 g)]  


 


Cephalexin [Keflex] 1 tab PO QID #28 capsule 17


 


Hydrocortisone 1% Cream [Cortizone 0.5 gm TP BID #1 tube 17





1% Cream]  


 


DiphenhydrAMINE [Benadryl] 50 mg PO Q6 PRN #24 cap 17


 


Levofloxacin [Levaquin] 500 mg PO DAILY #10 tablet 17


 


Naproxen 1 tab PO BID PRN #14 tab 17


 


Pseudoephedrine [Sudafed Tab] 60 mg PO Q6 PRN #20 tab 17


 


oxyCODONE/Acetaminophen [Percocet 1 ea PO Q6 PRN #6 tab 17





5/325 mg Tab]  


 


predniSONE [Prednisone] 3 tab PO DAILY #12 tab 17


 


Azithromycin [Zithromax] 250 mg PO QAM #1 pkg 04/15/18














- Allergies


Allergies/Adverse Reactions: 


 Allergies











Allergy/AdvReac Type Severity Reaction Status Date / Time


 


influenza virus vaccine, Allergy  ANAPHYLAXIS Verified 04/15/18 04:46





specific     





[influenza virus     





vacc,specific]     


 


latex Allergy  RASH Verified 04/15/18 04:46


 


oseltamivir phosphate Allergy  ANAPHYLAXIS Verified 04/15/18 04:46





[From Tamiflu]     


 


Penicillins Allergy  RASH Verified 04/15/18 04:46


 


pomegranate Allergy  RASH Verified 04/15/18 04:46














Review of Systems


ROS Statement: Except As Marked, All Systems Reviewed And Found Negative


Constitutional: Positive for: Fever


ENT: Positive for: Nose Pain, Nose Discharge, Nose Congestion


Neurological: Positive for: Headache





<Ryder Omer - Last Filed: 04/15/18 05:40>





Physical Exam





- Physical Exam


Appears: Positive for: Uncomfortable


Head Exam: Positive for: ATRAUMATIC


Skin: Positive for: Normal Color, Warm


Eye Exam: Positive for: PERRL.  Negative for: Periorbital swelling, Periorbital 

tenderness


ENT: Positive for: Sinus Pain/Drainage, Nasal Congestion.  Negative for: 

Pharyngeal Erythema, Tonsillar Exudate, Tonsillar Swelling


Neck: Positive for: Normal


Cardiovascular/Chest: Positive for: Regular Rate, Rhythm.  Negative for: Gallop

, Murmur


Respiratory: Positive for: Normal Breath Sounds.  Negative for: Crackles, Rales

, Wheezing, Respiratory Distress


Gastrointestinal/Abdominal: Positive for: Soft.  Negative for: Tenderness


Extremity: Negative for: Tenderness, Pedal Edema, Calf Tenderness, Swelling


Neurologic/Psych: Positive for: Alert, Oriented.  Negative for: Motor/Sensory 

Deficits





<Ryder Omer - Last Filed: 04/15/18 05:40>





- ECG


O2 Sat by Pulse Oximetry: 99





<Ryder Omer - Last Filed: 04/15/18 05:40>





Medical Decision Making





<Ryder Omer - Last Filed: 04/15/18 05:40>





<Juan Pablo Perez - Last Filed: 18 05:35>


Medical Decision Makin y/o presents with acute sinusitis





PCN allergy/Pregnant


Zithromax PO for 5 days


Tylenol 650 mg q6h PRN for pain


Saline nasal drops q4h with bulb aspiration


F/U with ENT as outpatient (Ryder Omer)





Disposition





- Patient ED Disposition


Is Patient to be Admitted: No





- Disposition


Disposition: Routine/Home


Disposition Time: 05:30





<Ryder Omer - Last Filed: 04/15/18 05:40>





<Juan Pablo Perez - Last Filed: 18 05:35>





- Clinical Impression


Clinical Impression: 


 Sinusitis








- Disposition


Condition: STABLE


Additional Instructions: 


F/u as outpatient with ENT for further workup


Take Tylenol as prescribed


C/W Saline nasal drops


Prescriptions: 


Azithromycin [Zithromax] 250 mg PO QAM #1 pkg


Instructions:  Sinusitis in Adults


Forms:  [facility] (English)

## 2018-07-11 ENCOUNTER — HOSPITAL ENCOUNTER (EMERGENCY)
Dept: HOSPITAL 14 - H.EROB2 | Age: 23
Discharge: HOME | End: 2018-07-11
Payer: COMMERCIAL

## 2018-07-11 VITALS — BODY MASS INDEX: 21.7 KG/M2

## 2018-07-11 DIAGNOSIS — Z3A.38: ICD-10-CM

## 2018-07-11 DIAGNOSIS — R10.2: ICD-10-CM

## 2018-07-11 DIAGNOSIS — O47.1: Primary | ICD-10-CM

## 2018-07-11 DIAGNOSIS — O26.93: ICD-10-CM

## 2018-07-11 NOTE — OBDCSUM
===========================

Datetime: 07/11/2018 20:55

===========================

   

Discharged to, Provider:  Home

Follow up at, Provider:  

Disch Instr Activity:  Normal activity

Disch Instr Diet:  Regular

Discharge Instructions, Provider:  Routine instructions given

Discharge Diagnosis, Provider:  Hyperemesis Gravidafrida

Discharge Time:  07/11/2018 20:56

Follow up in weeks, Provider:  7/19/2018

Disch Referrals:  None

Contraception discussed, Prov:  Yes

Disch Activity Restrictions:  No lifting

## 2018-07-11 NOTE — OBHP
===========================

Datetime: 2018 20:30

===========================

   

IP Adm Impression:  Term, intrauterine pregnancy; No Active Labor; Intact Membranes

IP Admit Plan:  Discharge home

Admit Comment, IP Provider:  23yo  IUP at 36+w c/o lower abd pain since 5pm...q8m. No SROM. No
 VB. +FM

      

   PNC: EDC Aug 3 - prenatal chart rev'd

    PM: denies

   PSH: denies

   Allergy : Latex;PCN; Oseltamivir

   POBH:  x 3 spont ab x 1

   PGYNH: Hx Chl

      

   A; IUP at 36w

   not in labor 

   PLAN: labor instructions; f/u appt next th

   D/C home

      

      

Pelvic Type - PN:  Adequate

Extremities - PN:  Normal

Abdomen - PN:  Normal

Back - PN:  Normal

Neurologic - PN:  Normal

HEENT - PN:  Normal

General - PN:  Normal

Fetal Presentation-Admit:  Vertex

IP Fetus A Comments:  Sono ceph

FHR - Baseline A Provider:  140

Membranes, Provider:  Intact

Pool Provider:  Negative

IP Prenatal Hx Assessment:  The Prenatal History has been Reviewed and is Current

EGA AdmitDate IP:  36.5

IP Chief Complaint:  Uterine contractions

NICHD Variability Prov Fetus A:  Moderate 6-25bpm

NICHD Accel Fetus A IP Provider:  15X15

FHR Category Provider Fetus A:  Category I

NICHD Decel Fetus A IP Provider:  None

Dilatation, Provider:  FT

Effacement, Provider:  0

Station, Provider:  high

DTRs - PN:  Normal

## 2018-07-12 VITALS
HEART RATE: 83 BPM | SYSTOLIC BLOOD PRESSURE: 108 MMHG | DIASTOLIC BLOOD PRESSURE: 70 MMHG | TEMPERATURE: 98.4 F | RESPIRATION RATE: 17 BRPM

## 2018-07-12 NOTE — OBDCSUM
===========================

Datetime: 07/11/2018 20:55

===========================

   

Discharged to, Provider:  Home

Follow up at, Provider:  

Disch Instr Activity:  Normal activity

Disch Instr Diet:  Regular

Discharge Instructions, Provider:  Routine instructions given

Discharge Diagnosis, Provider:  False Labor - Undelivered

Discharge Time:  07/11/2018 20:56

Follow up in weeks, Provider:  7/19/2018

Disch Referrals:  None

Contraception discussed, Prov:  Yes

Disch Activity Restrictions:  No lifting

## 2018-07-18 ENCOUNTER — HOSPITAL ENCOUNTER (INPATIENT)
Dept: HOSPITAL 14 - H.EROB2 | Age: 23
LOS: 2 days | Discharge: HOME | End: 2018-07-20
Attending: OBSTETRICS & GYNECOLOGY | Admitting: OBSTETRICS & GYNECOLOGY
Payer: COMMERCIAL

## 2018-07-18 VITALS — OXYGEN SATURATION: 100 %

## 2018-07-18 VITALS — BODY MASS INDEX: 24.6 KG/M2

## 2018-07-18 DIAGNOSIS — O41.03X0: Primary | ICD-10-CM

## 2018-07-18 DIAGNOSIS — Z3A.37: ICD-10-CM

## 2018-07-18 LAB
BASOPHILS # BLD AUTO: 0 K/UL (ref 0–0.2)
BASOPHILS NFR BLD: 0.5 % (ref 0–2)
EOSINOPHIL # BLD AUTO: 0.1 K/UL (ref 0–0.7)
EOSINOPHIL NFR BLD: 0.6 % (ref 0–4)
ERYTHROCYTE [DISTWIDTH] IN BLOOD BY AUTOMATED COUNT: 14.1 % (ref 11.5–14.5)
HGB BLD-MCNC: 13.4 G/DL (ref 12–16)
LYMPHOCYTES # BLD AUTO: 1.4 K/UL (ref 1–4.3)
LYMPHOCYTES NFR BLD AUTO: 16.4 % (ref 20–40)
MCH RBC QN AUTO: 29.9 PG (ref 27–31)
MCHC RBC AUTO-ENTMCNC: 33.5 G/DL (ref 33–37)
MCV RBC AUTO: 89.4 FL (ref 81–99)
MONOCYTES # BLD: 1 K/UL (ref 0–0.8)
MONOCYTES NFR BLD: 11.6 % (ref 0–10)
NEUTROPHILS # BLD: 6.1 K/UL (ref 1.8–7)
NEUTROPHILS NFR BLD AUTO: 70.9 % (ref 50–75)
NRBC BLD AUTO-RTO: 0.1 % (ref 0–0)
PLATELET # BLD: 247 K/UL (ref 130–400)
PMV BLD AUTO: 8.2 FL (ref 7.2–11.7)
RBC # BLD AUTO: 4.48 MIL/UL (ref 3.8–5.2)
WBC # BLD AUTO: 8.6 K/UL (ref 4.8–10.8)

## 2018-07-18 PROCEDURE — 4A1HXCZ MONITORING OF PRODUCTS OF CONCEPTION, CARDIAC RATE, EXTERNAL APPROACH: ICD-10-PCS

## 2018-07-18 NOTE — OBADHP
===========================

Datetime: 2018 15:38

===========================

   

IP Adm Impression Other:  Oligohydramnios

Admit Comment, IP Provider:  PNP: Dr. William

   Last visit _ ultrasound: 2018

   21 yo  at 37.5 wks based on ultrasound  performed 2017 with LMP 10/5/2017 is presenti
ng for induction due to oligohydramnios with CAROLEE 2.6cm. Patient is also c/o contractions 8/10, and de
nied vaginal bleeding or loss of fluid. Fetal movement appreciated. 

   Patient was seen here 2018 for abdominal pain and was d/c because of false labor. 

   OBGYNhx: 

    x 3:

   -, F

   -, M

   -, 

   PMH: none 

   Meds: prenatals

   Allergies: flu vaccine- anaphylaxis

   Famhx: F-HTN

   Sochx: no Tobacco, alcohol or drug use

   Surghx: none

   ROS: denied cp, sob, dysuria, f/c/n/v/d

      

   Vitals:BP: 115/68 Pulse-88bpm Spo2- 100%

   PE: well appearing female in no acute distress

   Cardio: s1s2 auscultated, no murmurs

   Resp: clear b/l

   ABd: BS+

   FHR: 150 bpm, moderate variability

   [place]: occasional

   Vacm dil, 15% effaced, -2 station

   Ext: calves nontender

      

   A/P:21 yo  at 37.5 wks presenting for induction.

   1. Oligohydramnios- Admit to unit, induction of labor protocol initiated. 

      

   Patient seen and examined with Dr. Hank Bunn PGY-1.

   Attending Note:

   Patient was seen and examined with the resident and I agree with the above assessment.

   Pt will be admitted for IOL due to oligohydramnios as per MFM evaluation.

      

Abdomen - PN:  Normal

Lungs - PN:  Normal

Heart - PN:  Normal

General - PN:  Normal

FHR - Baseline A Provider:  150

Gestation - Est Wks by US:  37.5

Pool Provider:  Negative

IP Prenatal Hx Assessment:  The Prenatal History has been Reviewed and is Current

Vital Signs Provider:  Reviewed; Within Normal Limits

IP Chief Complaint:  Uterine contractions; Maternal discomfort

NICHD Variability Prov Fetus A:  Moderate 6-25bpm

NICHD Accel Fetus A IP Provider:  15X15

FHR Category Provider Fetus A:  Category I

NICHD Decel Fetus A IP Provider:  None

Dilatation, Provider:  1

Effacement, Provider:  15

Station, Provider:  -2

Genitourinary Exam:  Normal

EGA AdmitDate IP:  37.5

IP Adm Impression:  Term, intrauterine pregnancy; No Active Labor

IP Admit Plan:  Admit to unit; Initiate labor induction protocol

   

===========================

Datetime: 2018 20:30

===========================

   

Pelvic Type - PN:  Adequate

Extremities - PN:  Normal

Back - PN:  Normal

Neurologic - PN:  Normal

HEENT - PN:  Normal

Fetal Presentation-Admit:  Vertex

IP Fetus A Comments:  Sono ceph

Membranes, Provider:  Intact

DTRs - PN:  Normal

   

===========================

Datetime: 2018 13:03

===========================

   

Contraction Comments Provider:  None

## 2018-07-18 NOTE — OBHP
===========================

Datetime: 2018 15:38

===========================

   

IP Adm Impression:  Term, intrauterine pregnancy

IP Admit Plan:  Admit to unit; Initiate labor protocol

Admit Comment, IP Provider:  PNP: Dr. William

   Last visit _ ultrasound: 2018

   21 yo  at 37.5 wks based on ultrasound  performed 2017 with LMP 10/5/2017 is presenti
ng for induction due to oligohydramnios with CAROLEE 2.6cm. Patient is also c/o contractions 8/10, and de
nied vaginal bleeding or loss of fluid. Fetal movement appreciated. 

   Patient was seen here 2018 for abdominal pain and was d/c because of false labor. 

   OBGYNhx: 

    x 3:

   -, F

   -, M

   -, 

   PMH: none 

   Meds: prenatals

   Allergies: flu vaccine- anaphylaxis

   Famhx: F-HTN

   Sochx: no Tobacco, alcohol or drug use

   Surghx: none

   ROS: denied cp, sob, dysuria, f/c/n/v/d

      

   Vitals:BP: 115/68 Pulse-88bpm Spo2- 100%

   PE: well appearing female in no acute distress

   Cardio: s1s2 auscultated, no murmurs

   Resp: clear b/l

   ABd: BS+

   FHR: 150 bpm, moderate variability

   [place]: occasional

   Vacm dil, 15% effaced, -2 station

   Ext: calves nontender

      

   A/P:21 yo  at 37.5 wks presenting for induction.

   1. Oligohydramnios- Admit to unit, induction of labor protocol initiated. 

      

   Patient seen and examined with Dr. Hank Bunn PGY-1.

   Attending Note:

   Patient was seen and examined with the resident and I agree with the above assessment.

   Pt will be admitted for IOL due to oligohydramnios as per MFM evaluation.

      

Abdomen - PN:  Normal

Lungs - PN:  Normal

Heart - PN:  Normal

General - PN:  Normal

FHR - Baseline A Provider:  150

Gestation - Est Wks by US:  37.5

Pool Provider:  Negative

IP Prenatal Hx Assessment:  The Prenatal History has been Reviewed and is Current

EGA AdmitDate IP:  37.5

Vital Signs Provider:  Reviewed; Within Normal Limits

IP Chief Complaint:  Uterine contractions

NICHD Variability Prov Fetus A:  Moderate 6-25bpm

NICHD Accel Fetus A IP Provider:  [id number]

FHR Category Provider Fetus A:  Category I

NICHD Decel Fetus A IP Provider:  None

Dilatation, Provider:  1

Effacement, Provider:  15

Station, Provider:  -2

Genitourinary Exam:  Normal

## 2018-07-18 NOTE — OBPN
===========================

Datetime: 07/18/2018 21:35

===========================

   

IP Progress Impression:  Normal progression of labor

IP Informed Consent Obtain:  Vaginal Delivery

IP Procedures:  Sterile Vag Exam

IP Progress Plan:  Continue present management; Cervical Ripening

Membranes, Provider:  Intact

FHR - Baseline A Provider:  150

Gestation - Est Wks by US:  37.0

Fetal Presentation-Admit:  Vertex

IP Progress Note Comment:  cervidil placed at 1930pm

   Patient currently having regular contractions.

   Plan;

   Continue monitoring the progress of labor.

NICHD Accel Fetus A IP Provider:  [id number]

FHR Category Provider Fetus A:  Category I

NICHD Variability Prov Fetus A:  Moderate 6-25bpm

Dilatation, Provider:  3

Effacement, Provider:  50

Station, Provider:  -3

NICHD Decel Fetus A IP Provider:  None

   

===========================

Datetime: 07/18/2018 15:38

===========================

   

Pool Provider:  Negative

Vital Signs Provider:  Reviewed; Within Normal Limits

   

===========================

Datetime: 07/11/2018 20:30

===========================

   

IP Fetus A Comments:  Sono ceph

   

===========================

Datetime: 03/05/2018 13:03

===========================

   

Contraction Comments Provider:  None

## 2018-07-19 LAB
BASOPHILS # BLD AUTO: 0 K/UL (ref 0–0.2)
BASOPHILS NFR BLD: 0.3 % (ref 0–2)
EOSINOPHIL # BLD AUTO: 0.1 K/UL (ref 0–0.7)
EOSINOPHIL NFR BLD: 1 % (ref 0–4)
ERYTHROCYTE [DISTWIDTH] IN BLOOD BY AUTOMATED COUNT: 14.3 % (ref 11.5–14.5)
HGB BLD-MCNC: 13 G/DL (ref 12–16)
LYMPHOCYTES # BLD AUTO: 1.3 K/UL (ref 1–4.3)
LYMPHOCYTES NFR BLD AUTO: 12.5 % (ref 20–40)
MCH RBC QN AUTO: 29.5 PG (ref 27–31)
MCHC RBC AUTO-ENTMCNC: 33.1 G/DL (ref 33–37)
MCV RBC AUTO: 89.3 FL (ref 81–99)
MONOCYTES # BLD: 1.3 K/UL (ref 0–0.8)
MONOCYTES NFR BLD: 11.6 % (ref 0–10)
NEUTROPHILS # BLD: 8 K/UL (ref 1.8–7)
NEUTROPHILS NFR BLD AUTO: 74.6 % (ref 50–75)
NRBC BLD AUTO-RTO: 0 % (ref 0–0)
PLATELET # BLD: 243 K/UL (ref 130–400)
PMV BLD AUTO: 7.8 FL (ref 7.2–11.7)
RBC # BLD AUTO: 4.39 MIL/UL (ref 3.8–5.2)
WBC # BLD AUTO: 10.8 K/UL (ref 4.8–10.8)

## 2018-07-19 NOTE — OBDS
===================================

MATERNAL INFORMATION

===================================

   

Provider Comments:  Uncomplicated Spontaneous vaginal delivery of a viable female infant with BW of a
nd APGAR scores of 9 and 9 over an intact perineum.

   EBL- 150mls

   Patient tolerated the procedure well.

   

===================================

LABOR SUMMARY

===================================

   

EDC:  2018 00:00

   

===================================

LABOR INFORMATION

===================================

   

Cervical Ripening Agents:  Cervidil

   

===================================

PRESENTATION/POSITION BABY A

===================================

   

Presentation:  Cephalic

   

===================================

IDENTIFICATION/MEDS BABY A

===================================

   

ID Band Number:  [id number]

## 2018-07-20 NOTE — OBPPN
===========================

Datetime: 2018 08:02

===========================

   

PP Pain Prov:  Within normal limits

PP Nausea Prov:  Denies

PP Flatus Prov:  Yes

PP BM Prov:  Yes

PP Breasts Prov:  Normal

PP Heart Prov:  Normal

PP Lungs Prov:  Normal

PP Abdomen/Uterus Prov:  Normal

PP Lochia Prov:  Normal

PP Vulva/Perineum Prov:  Normal

PP CVA Tenderness Prov:  Normal

PP Extremities Prov:  Normal

PP Impression Prov:  Normal postpartum progression

PP Plan Prov:  Discharge

PP Progress Note Prov:  

   SHe feels fine

      

   A; S/P  day 2

   PLAN DIshcagre home and follow up [facility] 6w

Vital Signs Provider PP:  Reviewed; Within Normal Limits

## 2018-07-20 NOTE — OBDCSUM
===========================

Datetime: 07/20/2018 08:03

===========================

   

Discharged to, Provider:  Home

Follow up at, Provider:  [facility]

Disch Instr Activity:  Normal activity

Disch Instr Diet:  Regular

Discharge Instructions, Provider:  Routine instructions given

Discharge Diagnosis, Provider:  Term Pregnancy Delivered

Discharge Time:  07/20/2018 12:00

Follow up in weeks, Provider:  6week

Disch Referrals:  None

Contraception discussed, Prov:  Yes

Disch Activity Restrictions:  No lifting; No sexual activity; Nothing in vagina - [place], tampon
smartin

## 2018-07-21 VITALS
TEMPERATURE: 97.9 F | SYSTOLIC BLOOD PRESSURE: 106 MMHG | DIASTOLIC BLOOD PRESSURE: 67 MMHG | HEART RATE: 80 BPM | RESPIRATION RATE: 20 BRPM

## 2019-04-13 ENCOUNTER — HOSPITAL ENCOUNTER (EMERGENCY)
Dept: HOSPITAL 14 - H.ER | Age: 24
Discharge: HOME | End: 2019-04-13
Payer: COMMERCIAL

## 2019-04-13 VITALS — TEMPERATURE: 98.5 F

## 2019-04-13 VITALS — SYSTOLIC BLOOD PRESSURE: 110 MMHG | DIASTOLIC BLOOD PRESSURE: 70 MMHG | RESPIRATION RATE: 18 BRPM

## 2019-04-13 VITALS — HEART RATE: 70 BPM

## 2019-04-13 VITALS — OXYGEN SATURATION: 98 %

## 2019-04-13 VITALS — BODY MASS INDEX: 24.6 KG/M2

## 2019-04-13 DIAGNOSIS — I45.6: ICD-10-CM

## 2019-04-13 DIAGNOSIS — R07.9: Primary | ICD-10-CM

## 2019-04-13 LAB
ALBUMIN SERPL-MCNC: 4.3 G/DL (ref 3.5–5)
ALBUMIN/GLOB SERPL: 1.5 {RATIO} (ref 1–2.1)
ALT SERPL-CCNC: 41 U/L (ref 9–52)
AST SERPL-CCNC: 28 U/L (ref 14–36)
BASOPHILS # BLD AUTO: 0 K/UL (ref 0–0.2)
BASOPHILS NFR BLD: 0.5 % (ref 0–2)
BUN SERPL-MCNC: 14 MG/DL (ref 7–17)
CALCIUM SERPL-MCNC: 9.3 MG/DL (ref 8.4–10.2)
EOSINOPHIL # BLD AUTO: 0.4 K/UL (ref 0–0.7)
EOSINOPHIL NFR BLD: 8 % (ref 0–4)
ERYTHROCYTE [DISTWIDTH] IN BLOOD BY AUTOMATED COUNT: 13.7 % (ref 11.5–14.5)
GFR NON-AFRICAN AMERICAN: > 60
HGB BLD-MCNC: 13.7 G/DL (ref 12–16)
LYMPHOCYTES # BLD AUTO: 2 K/UL (ref 1–4.3)
LYMPHOCYTES NFR BLD AUTO: 38.3 % (ref 20–40)
MCH RBC QN AUTO: 31.4 PG (ref 27–31)
MCHC RBC AUTO-ENTMCNC: 33.2 G/DL (ref 33–37)
MCV RBC AUTO: 94.4 FL (ref 81–99)
MONOCYTES # BLD: 0.5 K/UL (ref 0–0.8)
MONOCYTES NFR BLD: 9 % (ref 0–10)
NEUTROPHILS # BLD: 2.3 K/UL (ref 1.8–7)
NEUTROPHILS NFR BLD AUTO: 44.2 % (ref 50–75)
NRBC BLD AUTO-RTO: 0.1 % (ref 0–0)
PLATELET # BLD: 274 K/UL (ref 130–400)
PMV BLD AUTO: 7.3 FL (ref 7.2–11.7)
RBC # BLD AUTO: 4.36 MIL/UL (ref 3.8–5.2)
WBC # BLD AUTO: 5.2 K/UL (ref 4.8–10.8)

## 2019-04-13 PROCEDURE — 96374 THER/PROPH/DIAG INJ IV PUSH: CPT

## 2019-04-13 PROCEDURE — 85025 COMPLETE CBC W/AUTO DIFF WBC: CPT

## 2019-04-13 PROCEDURE — 81025 URINE PREGNANCY TEST: CPT

## 2019-04-13 PROCEDURE — 80053 COMPREHEN METABOLIC PANEL: CPT

## 2019-04-13 PROCEDURE — 84484 ASSAY OF TROPONIN QUANT: CPT

## 2019-04-13 PROCEDURE — 71046 X-RAY EXAM CHEST 2 VIEWS: CPT

## 2019-04-13 PROCEDURE — 99283 EMERGENCY DEPT VISIT LOW MDM: CPT

## 2019-04-13 PROCEDURE — 93005 ELECTROCARDIOGRAM TRACING: CPT

## 2019-04-13 NOTE — ED PDOC
HPI: Chest Pain


Time Seen by Provider: 04/13/19 14:22


Chief Complaint (Nursing): Chest Pain


Chief Complaint (Provider): Chest pain


History Per: Patient


History/Exam Limitations: no limitations


Onset/Duration Of Symptoms: Days (few days)


Additional Complaint(s): 





Pt. with chest pain left ribs with dyspnea.  No weakness, numbness, tingles, 

headaches, dizziness.  No hormones, leg pain, long distance travel.   No injury.

Has wpw and sees different cardiologists who say to watch.  It comes and goes, 

no additional tx for it.





Past Medical History


Reviewed: Nursing Documentation, Vital Signs


Vital Signs: 





                                Last Vital Signs











Temp  98.5 F   04/13/19 14:15


 


Pulse  65   04/13/19 14:15


 


Resp  20   04/13/19 14:15


 


BP  104/64   04/13/19 14:15


 


Pulse Ox  98   04/13/19 14:15














- Medical History


PMH: Asthma, CAD (WPW)


   Denies: Alzheimer's Disease, Chronic Kidney Disease





- Surgical History


Surgical History: No Surg Hx





- Family History


Family History: States: Unknown Family Hx





- Home Medications


Home Medications: 


                                Ambulatory Orders











 Medication  Instructions  Recorded


 


Prenatal Vit No.126/Iron/Folic 1 tab PO DAILY 09/19/16





[Prenavite]  


 


Albuterol HFA [Ventolin HFA 90 1 puff IH Q6 PRN #1 inhaler 03/04/17





mcg/actuation (8 g)]  














- Allergies


Allergies/Adverse Reactions: 


                                    Allergies











Allergy/AdvReac Type Severity Reaction Status Date / Time


 


influenza virus vaccine, Allergy  ANAPHYLAXIS Verified 04/13/19 14:10





specific     





[influenza virus     





vacc,specific]     


 


latex Allergy  RASH Verified 04/13/19 14:10


 


oseltamivir phosphate Allergy  ANAPHYLAXIS Verified 04/13/19 14:10





[From Tamiflu]     


 


Penicillins Allergy  RASH Verified 04/13/19 14:10


 


pomegranate Allergy  RASH Verified 04/13/19 14:10














Review of Systems


ROS Statement: Except As Marked, All Systems Reviewed And Found Negative


Cardiovascular: Positive for: Chest Pain


Respiratory: Positive for: Shortness of Breath





Physical Exam





- Reviewed


Nursing Documentation Reviewed: Yes


Vital Signs Reviewed: Yes





- Physical Exam


Appears: Positive for: Non-toxic, No Acute Distress


Head Exam: Positive for: ATRAUMATIC, NORMAL INSPECTION, NORMOCEPHALIC


Skin: Positive for: Normal Color, Warm, DRY


Eye Exam: Positive for: EOMI, Normal appearance, PERRL


ENT: Positive for: Normal ENT Inspection


Neck: Positive for: Normal, Painless ROM


Cardiovascular/Chest: Positive for: Regular Rate, Rhythm.  Negative for: Chest 

Non Tender (left lower chest), Edema


Respiratory: Positive for: CNT, Normal Breath Sounds


Gastrointestinal/Abdominal: Positive for: Soft, Tenderness (epigastric mild)


Back: Positive for: Normal Inspection.  Negative for: L CVA Tenderness, R CVA 

Tenderness


Extremity: Positive for: Normal ROM.  Negative for: Tenderness


Neurological/Psych: Positive for: Awake, Alert, Normal Tone





- ECG


O2 Sat by Pulse Oximetry: 98


Pulse Ox Interpretation: Normal





- Progress


ED Course And Treament: 





1435:  Stable.  AAOx3.  Dr. Marks to take over care.  





Disposition





- Clinical Impression


Clinical Impression: 


 Acute chest pain








- Patient ED Disposition


Is Patient to be Admitted: Transfer of Care





- Disposition


Disposition: Transfer of Care


Disposition Time: 14:37


Condition: FAIR


Patient Signed Over To: Tessa Marks

## 2019-04-13 NOTE — ED PDOC
- Laboratory Results


Result Diagrams: 


                                 04/13/19 15:10





                                 04/13/19 15:10





- ECG


O2 Sat by Pulse Oximetry: 98





Medical Decision Making


Medical Decision Making: 


Time: 1500


--Patient is endorsed to provider by Dr. House, pending ED work-up, 

reassessment, and final disposition.





Time: 1605


--CXR FINDINGS:


LUNGS:


No active pulmonary disease.


PLEURA:


No significant pleural effusion identified. No pneumothorax apparent.


CARDIOVASCULAR:


No aortic atherosclerotic calcification present.


Normal cardiac size. No pulmonary vascular congestion. 


OSSEOUS STRUCTURES:


No significant abnormalities.


VISUALIZED UPPER ABDOMEN:


Normal.


OTHER FINDINGS:


None.


IMPRESSION:


No active disease.





Time: 1615


--Labs reviewed: (-) significant clinical abnormality.





------------------------------------------------------------------------------

-------------------


Scribe Attestation:


Documented by Sharla Mallory, acting as a scribe for Tessa Marks MD.


Provider Scribe Attestation:


All medical record entries made by the Scribe were at my direction and 

personally dictated by me. I have reviewed the chart and agree that the record 

accurately reflects my personal performance of the history, physical exam, 

medical decision making, and the department course for this patient. I have also

personally directed, reviewed, and agree with the discharge instructions and 

disposition.





Disposition


Counseled Patient/Family Regarding: Studies Performed, Diagnosis, Need For 

Followup





- Clinical Impression


Clinical Impression: 


 History of Sinan-Parkinson-White (WPW) syndrome, Chest pain








- POA


Present On Arrival: None





- Disposition


Referrals: 


[facility] [place] [Outside] (PLEASE FOLLOWUP AT CLINIC FOR 

REEVALUATION)


Disposition: Routine/Home


Disposition Time: 16:22


Condition: STABLE


Instructions:  Chest Pain That Is Not Caused by the Heart (DC)


Forms:  [facility] ED School/Work Excuse

## 2019-04-13 NOTE — RAD
Date of service: 



04/13/2019



HISTORY:

 pain 



COMPARISON:

Chest radiograph dated 07/01/2015



TECHNIQUE:

Chest PA and lateral views



FINDINGS:



LUNGS:

No active pulmonary disease.



PLEURA:

No significant pleural effusion identified. No pneumothorax apparent.



CARDIOVASCULAR:

No aortic atherosclerotic calcification present.



Normal cardiac size. No pulmonary vascular congestion. 



OSSEOUS STRUCTURES:

No significant abnormalities.



VISUALIZED UPPER ABDOMEN:

Normal.



OTHER FINDINGS:

None.



IMPRESSION:

No active disease.

## 2019-04-14 NOTE — CARD
--------------- APPROVED REPORT --------------





Date of service: 04/13/2019



EKG Measurement

Heart [id number]

ND [id number]

[id number]

[id number]

[id number]



<Conclusion>

Normal sinus rhythm

Ventricular pre-excitation, WPW pattern type B

Abnormal ECG

## 2019-10-25 NOTE — CARD
--------------- APPROVED REPORT --------------





EKG Measurement

Heart [id number]

RI [id number]

[id number]

[id number]

[id number]



<Conclusion>

Sinus rhythm with short RI

Probable WPW pattern with delta wave in lead V3
no